# Patient Record
Sex: MALE | Race: WHITE
[De-identification: names, ages, dates, MRNs, and addresses within clinical notes are randomized per-mention and may not be internally consistent; named-entity substitution may affect disease eponyms.]

---

## 2022-12-20 ENCOUNTER — HOSPITAL ENCOUNTER (OUTPATIENT)
Dept: HOSPITAL 94 - PAS | Age: 62
Discharge: HOME | End: 2022-12-20
Attending: SURGERY
Payer: MEDICARE

## 2022-12-20 VITALS — HEIGHT: 71 IN | BODY MASS INDEX: 28.86 KG/M2 | WEIGHT: 206.13 LBS

## 2022-12-20 VITALS — DIASTOLIC BLOOD PRESSURE: 72 MMHG | SYSTOLIC BLOOD PRESSURE: 124 MMHG

## 2022-12-20 VITALS — SYSTOLIC BLOOD PRESSURE: 133 MMHG | DIASTOLIC BLOOD PRESSURE: 75 MMHG

## 2022-12-20 VITALS — DIASTOLIC BLOOD PRESSURE: 77 MMHG | SYSTOLIC BLOOD PRESSURE: 130 MMHG

## 2022-12-20 VITALS — DIASTOLIC BLOOD PRESSURE: 70 MMHG | SYSTOLIC BLOOD PRESSURE: 138 MMHG

## 2022-12-20 VITALS — DIASTOLIC BLOOD PRESSURE: 77 MMHG | SYSTOLIC BLOOD PRESSURE: 144 MMHG

## 2022-12-20 VITALS — SYSTOLIC BLOOD PRESSURE: 129 MMHG | DIASTOLIC BLOOD PRESSURE: 72 MMHG

## 2022-12-20 DIAGNOSIS — Z88.6: ICD-10-CM

## 2022-12-20 DIAGNOSIS — K80.10: Primary | ICD-10-CM

## 2022-12-20 DIAGNOSIS — Z79.899: ICD-10-CM

## 2022-12-20 DIAGNOSIS — I10: ICD-10-CM

## 2022-12-20 DIAGNOSIS — K21.9: ICD-10-CM

## 2022-12-20 DIAGNOSIS — Z82.49: ICD-10-CM

## 2022-12-20 DIAGNOSIS — Z90.89: ICD-10-CM

## 2022-12-20 DIAGNOSIS — Z98.890: ICD-10-CM

## 2022-12-20 DIAGNOSIS — Z87.442: ICD-10-CM

## 2022-12-20 DIAGNOSIS — E78.5: ICD-10-CM

## 2022-12-20 DIAGNOSIS — Z85.89: ICD-10-CM

## 2022-12-20 DIAGNOSIS — Z88.5: ICD-10-CM

## 2022-12-20 DIAGNOSIS — Z87.891: ICD-10-CM

## 2022-12-20 PROCEDURE — 88304 TISSUE EXAM BY PATHOLOGIST: CPT

## 2022-12-20 PROCEDURE — 82948 REAGENT STRIP/BLOOD GLUCOSE: CPT

## 2022-12-20 PROCEDURE — 93005 ELECTROCARDIOGRAM TRACING: CPT

## 2022-12-20 PROCEDURE — 47563 LAPARO CHOLECYSTECTOMY/GRAPH: CPT

## 2022-12-20 NOTE — NUR
PATIENT A&OX4, DENIES PAIN, V/S WNL, SCD OFF, 20G TO LUE D/C, ABDOMEN LAP SITE CLEAN W/ NO 
S/S OF COMPLICATIONS. I HAVE REVIEWED D/C INSTRUCTIONS WITH PATIENT and they have verbalized 
understanding patient d/c home with all belongings and family gave transport home.

## 2022-12-20 NOTE — NUR
Received from OR via BED, accompanied by Anesthesiologist and report given by 
Anesthesiologist. PATIENT WAKING UP, NO S/S OF PAIN, V/S WNL, SCD ON, 20G TO LUE, ABDOMEN 
LAP SITE CLEAN W/ NO S/S OF COMPLICATIONS